# Patient Record
Sex: FEMALE | Race: BLACK OR AFRICAN AMERICAN | NOT HISPANIC OR LATINO | Employment: FULL TIME | URBAN - METROPOLITAN AREA
[De-identification: names, ages, dates, MRNs, and addresses within clinical notes are randomized per-mention and may not be internally consistent; named-entity substitution may affect disease eponyms.]

---

## 2023-08-22 ENCOUNTER — OFFICE VISIT (OUTPATIENT)
Dept: URGENT CARE | Facility: CLINIC | Age: 43
End: 2023-08-22
Payer: COMMERCIAL

## 2023-08-22 VITALS
BODY MASS INDEX: 24.16 KG/M2 | TEMPERATURE: 98.4 F | HEIGHT: 65 IN | DIASTOLIC BLOOD PRESSURE: 70 MMHG | OXYGEN SATURATION: 98 % | RESPIRATION RATE: 18 BRPM | WEIGHT: 145 LBS | HEART RATE: 83 BPM | SYSTOLIC BLOOD PRESSURE: 110 MMHG

## 2023-08-22 DIAGNOSIS — J02.0 ACUTE STREPTOCOCCAL PHARYNGITIS: Primary | ICD-10-CM

## 2023-08-22 PROBLEM — M54.31 SCIATICA OF RIGHT SIDE: Status: ACTIVE | Noted: 2020-06-19

## 2023-08-22 PROBLEM — Z12.31 SCREENING MAMMOGRAM, ENCOUNTER FOR: Status: ACTIVE | Noted: 2021-06-25

## 2023-08-22 PROBLEM — M70.71 BURSITIS OF HIP, RIGHT: Status: ACTIVE | Noted: 2023-03-04

## 2023-08-22 PROBLEM — S23.9XXA THORACIC BACK SPRAIN: Status: ACTIVE | Noted: 2020-12-02

## 2023-08-22 PROBLEM — Z86.711 PERSONAL HISTORY OF PULMONARY EMBOLISM: Status: ACTIVE | Noted: 2019-02-18

## 2023-08-22 PROBLEM — R76.8 ANA POSITIVE: Status: ACTIVE | Noted: 2022-06-21

## 2023-08-22 PROBLEM — Z97.5 FAMILY PLANNING, IUD (INTRAUTERINE DEVICE) IN PLACE: Status: ACTIVE | Noted: 2019-02-18

## 2023-08-22 PROBLEM — D64.89 OTHER SPECIFIED ANEMIAS: Status: ACTIVE | Noted: 2022-06-21

## 2023-08-22 PROBLEM — Z83.3 FAMILY HISTORY OF DIABETES MELLITUS IN MOTHER: Status: ACTIVE | Noted: 2019-02-18

## 2023-08-22 LAB — S PYO AG THROAT QL: POSITIVE

## 2023-08-22 PROCEDURE — 87880 STREP A ASSAY W/OPTIC: CPT | Performed by: FAMILY MEDICINE

## 2023-08-22 PROCEDURE — 99203 OFFICE O/P NEW LOW 30 MIN: CPT | Performed by: FAMILY MEDICINE

## 2023-08-22 RX ORDER — PENICILLIN V POTASSIUM 500 MG/1
500 TABLET ORAL EVERY 12 HOURS
Qty: 20 TABLET | Refills: 0 | Status: SHIPPED | OUTPATIENT
Start: 2023-08-22 | End: 2023-09-01

## 2023-08-22 NOTE — PATIENT INSTRUCTIONS
Acute Streptococcal Pharyngitis  - rapid strep test in office is positive   - Penicillin VK x 10 days prescribed, complete as directed  - take Tylenol as needed for pain    - drink plenty of fluids   - advised warm salt water gargles and throat lozenges as needed   - drink warm tea w/ lemon and honey   - follow up w/ PCP for re-check in 3-5 days  - if symptoms persist despite treatment, worsen, or any new symptoms present, should be seen in the ER

## 2023-08-22 NOTE — PROGRESS NOTES
[facility]        NAME: Alexandra Dawn is a 37 y.o. female  : 1980    MRN: [id number]  DATE: 2023  TIME: 9:21 AM    Assessment and Plan   Acute streptococcal pharyngitis [J02.0]  1. Acute streptococcal pharyngitis  POCT rapid strepA    penicillin V potassium (VEETID) 500 mg tablet            Patient Instructions     Patient Instructions   1. Acute Streptococcal Pharyngitis  - rapid strep test in office is positive   - Penicillin VK x 10 days prescribed, complete as directed  - take Tylenol as needed for pain    - drink plenty of fluids   - advised warm salt water gargles and throat lozenges as needed   - drink warm tea w/ lemon and honey   - follow up w/ PCP for re-check in 3-5 days  - if symptoms persist despite treatment, worsen, or any new symptoms present, should be seen in the ER       Follow up with PCP in 3-5 days. Proceed to  ER if symptoms worsen. Chief Complaint     Chief Complaint   Patient presents with   • Sore Throat     Pt here ill x 2 days pt states sore throat, feverish, chills, headache. Pt used Tylenol. History of Present Illness       36 yo female presents c/o sore throat x 2 days. She has felt feverish with chills and has had a headache. No other URI symptoms. No chest pain or SOB. Non-smoker. No abdominal pain or GI sx. No skin rashes. No loss of taste or smell. No neck pain or swelling. No feelings of throat closing or difficulty swallowing. No drooling or muffled voice. No recent travel or known sick contacts. She has an allergy to NSAIDs. She has been taking Dayquil as needed for the symptoms. She denies any chance of pregnancy. Rapid strep test performed in office is positive. Review of Systems   Review of Systems   Constitutional:        As noted in HPI   HENT:        As noted in HPI   Eyes: Negative. Respiratory: Negative. Cardiovascular: Negative. Gastrointestinal: Negative. Musculoskeletal: Negative. Skin: Negative. Allergic/Immunologic:        NSAIDs   Neurological: Negative. Hematological: Negative. Current Medications       Current Outpatient Medications:   •  Multiple Vitamin (MULTIVITAMIN ADULT PO), Take 1 capsule by mouth daily, Disp: , Rfl:   •  penicillin V potassium (VEETID) 500 mg tablet, Take 1 tablet (500 mg total) by mouth every 12 (twelve) hours for 10 days, Disp: 20 tablet, Rfl: 0    Current Allergies     Allergies as of 2023 - Reviewed 2023   Allergen Reaction Noted   • Naproxen Other (See Comments) 2020            The following portions of the patient's history were reviewed and updated as appropriate: allergies, current medications, past family history, past medical history, past social history, past surgical history and problem list.     Past Medical History:   Diagnosis Date   • Patient denies medical problems        Past Surgical History:   Procedure Laterality Date   •  SECTION         Family History   Problem Relation Age of Onset   • Diabetes Mother    • No Known Problems Father          Medications have been verified. Objective   /70   Pulse 83   Temp 98.4 °F (36.9 °C)   Resp 18   Ht 5' 5" (1.651 m)   Wt 65.8 kg (145 lb)   SpO2 98%   BMI 24.13 kg/m²   No LMP recorded. Physical Exam     Physical Exam  Vitals and nursing note reviewed. Constitutional:       General: She is awake. She is not in acute distress. Appearance: Normal appearance. She is well-developed and well-groomed. She is not ill-appearing, toxic-appearing or diaphoretic. HENT:      Head: Normocephalic and atraumatic. Jaw: There is normal jaw occlusion. Right Ear: Tympanic membrane, ear canal and external ear normal.      Left Ear: Tympanic membrane, ear canal and external ear normal.      Nose: Nose normal.      Mouth/Throat:      Lips: Pink. No lesions. Mouth: Mucous membranes are moist.      Pharynx: Uvula midline.  Posterior oropharyngeal erythema present. No pharyngeal swelling, oropharyngeal exudate or uvula swelling. Tonsils: Tonsillar exudate present. No tonsillar abscesses. Comments: Airway fully patent  Erythema and slight enlargement of both tonsils. Exudates present on both tonsils. Eyes:      Conjunctiva/sclera: Conjunctivae normal.   Neck:      Trachea: Trachea and phonation normal.   Cardiovascular:      Rate and Rhythm: Normal rate and regular rhythm. Pulses: Normal pulses. Heart sounds: Normal heart sounds. Pulmonary:      Effort: Pulmonary effort is normal. No tachypnea, accessory muscle usage or respiratory distress. Breath sounds: Normal breath sounds and air entry. Musculoskeletal:      Cervical back: Neck supple. No edema, erythema, rigidity or tenderness. Lymphadenopathy:      Cervical: No cervical adenopathy. Skin:     General: Skin is warm and dry. Capillary Refill: Capillary refill takes less than 2 seconds. Coloration: Skin is not pale. Findings: No rash. Neurological:      Mental Status: She is alert and oriented to person, place, and time. Mental status is at baseline. Psychiatric:         Mood and Affect: Mood normal.         Behavior: Behavior normal. Behavior is cooperative. Thought Content:  Thought content normal.         Judgment: Judgment normal.